# Patient Record
Sex: MALE | URBAN - METROPOLITAN AREA
[De-identification: names, ages, dates, MRNs, and addresses within clinical notes are randomized per-mention and may not be internally consistent; named-entity substitution may affect disease eponyms.]

---

## 2018-11-28 ENCOUNTER — MOHS SURGERY-ROUTINE (OUTPATIENT)
Dept: URBAN - METROPOLITAN AREA CLINIC 12 | Facility: CLINIC | Age: 58
Setting detail: DERMATOLOGY
End: 2018-11-28

## 2018-11-28 DIAGNOSIS — C44.42 SQUAMOUS CELL CARCINOMA OF SKIN OF SCALP AND NECK: ICD-10-CM

## 2018-11-28 DIAGNOSIS — C44.622 SQUAMOUS CELL CARCINOMA OF SKIN OF RIGHT UPPER LIMB, INCLUDING SHOULDER: ICD-10-CM

## 2018-11-28 PROCEDURE — 13132 CMPLX RPR F/C/C/M/N/AX/G/H/F: CPT

## 2018-11-28 PROCEDURE — 17311 MOHS 1 STAGE H/N/HF/G: CPT

## 2020-05-26 NOTE — PROCEDURE NOTE: SURGICAL
"<p>Prior to commencing surgery patient identification, surgical procedure, site, and side were confirmed by Dr. Liang.&nbsp; Following topical proparacaine anesthesia, the patient was positioned at the YAG laser, a contact lens coupled to the cornea of the right eye with methylcellulose and an axial posterior capsulotomy performed without complication using 3.4 Mj x 36. Attention was then turned to the left eye and a contact lens coupled to the cornea of the left eye with methylcellulose and an axial posterior capsulotomy performed without complication using 3.3 Mj x 31. One drop of Alphagan was instilled in both eyes and the patient returned to the holding area having tolerated the procedure well and without complication. </p><p><span style=""font-size:12px;"">MRN:[id number]</span><br /></p>"

## 2021-06-08 ENCOUNTER — IMPORTED ENCOUNTER (OUTPATIENT)
Dept: URBAN - METROPOLITAN AREA CLINIC 9 | Facility: CLINIC | Age: 61
End: 2021-06-08

## 2021-06-08 PROBLEM — H52.4: Noted: 2021-06-08

## 2021-06-08 PROBLEM — H43.813: Noted: 2021-06-08

## 2021-06-08 PROBLEM — H04.123: Noted: 2021-06-08

## 2021-10-16 ASSESSMENT — VISUAL ACUITY
OD_CC: 20/20 SN
OS_CC: 14.1
OS_CC: 20/20 SN
OD_CC: 14.1
OS_SC: 20/80 SN
OD_SC: 20/60 - SN

## 2021-10-16 ASSESSMENT — TONOMETRY
OD_IOP_MMHG: 15
OS_IOP_MMHG: 16

## 2022-05-13 ENCOUNTER — ESTABLISHED PATIENT (OUTPATIENT)
Dept: URBAN - METROPOLITAN AREA CLINIC 9 | Facility: CLINIC | Age: 62
End: 2022-05-13

## 2022-05-13 DIAGNOSIS — H04.123: ICD-10-CM

## 2022-05-13 DIAGNOSIS — H43.813: ICD-10-CM

## 2022-05-13 DIAGNOSIS — H52.4: ICD-10-CM

## 2022-05-13 DIAGNOSIS — H25.13: ICD-10-CM

## 2022-05-13 PROCEDURE — 92310B CONTACT LEN 60

## 2022-05-13 PROCEDURE — 92014 COMPRE OPH EXAM EST PT 1/>: CPT

## 2022-05-13 PROCEDURE — 92015 DETERMINE REFRACTIVE STATE: CPT

## 2022-05-13 ASSESSMENT — VISUAL ACUITY
OD_CC: 20/20-1
OU_CC: 20/20
OU_CC: 20/25+2
OS_CC: 20/30-1

## 2022-05-13 ASSESSMENT — TONOMETRY
OD_IOP_MMHG: 16
OS_IOP_MMHG: 17

## 2024-02-06 NOTE — PATIENT DISCUSSION
[place] grid at home. MVI/AREDS discussed. Patient to call if any changes in vision or grid card. Pt is requesting medication losartan 50 MG and amlodipine 10 MG to be sent to [facility] pharmacy in [place], pt stated he only wanted the metformin at [facility] but this other medication to be sent to [facility], please advise.

## 2024-09-15 NOTE — PATIENT DISCUSSION
Discussed the risks/benefits of laser capsulotomy. Laser recommended. Patient elects to proceed. As certified below, I, or a nurse practitioner or physician assistant working with me, had a face-to-face encounter that meets the physician face-to-face encounter requirements.